# Patient Record
(demographics unavailable — no encounter records)

---

## 2024-10-30 NOTE — HISTORY OF PRESENT ILLNESS
[FreeTextEntry1] : 48 YO M seen TODAY 10/30/2024 as NPT for pain in the penis. He told me that for the past month, he has had a feeling of pressure in his testes. not related to any activity. It is mild and tolerable, but bothersome. Over the last week, it has also started in the penile shaft. This has never happened before and he has not been evaluated or treated for this yet. Patient is on no medication, NKMA, no known FH of prostate cancer. Patient is a  on [facility].  UA negative IPSS 0 HANNAH 0 JOHN 25

## 2024-10-30 NOTE — PHYSICAL EXAM
[Normal Appearance] : normal appearance [General Appearance - In No Acute Distress] : no acute distress [Edema] : no peripheral edema [] : no respiratory distress [Abdomen Soft] : soft [Abdomen Tenderness] : non-tender [Abdomen Mass (___ Cm)] : no abdominal mass palpated [Abdomen Hernia] : no hernia was discovered [Costovertebral Angle Tenderness] : no ~M costovertebral angle tenderness [Urethral Meatus] : meatus normal [Penis Abnormality] : normal circumcised penis [Urinary Bladder Findings] : the bladder was normal on palpation [Scrotum] : the scrotum was normal [Epididymis] : the epididymides were normal [Testes Tenderness] : no tenderness of the testes [Testes Mass (___cm)] : there were no testicular masses [Prostate Tenderness] : the prostate was not tender [No Prostate Nodules] : no prostate nodules [Prostate Size ___ (0-4)] : prostate size [unfilled] (scale: 0-4) [Normal Station and Gait] : the gait and station were normal for the patient's age [Skin Turgor] : supple [No Focal Deficits] : no focal deficits [Oriented To Time, Place, And Person] : oriented to person, place, and time [Affect] : the affect was normal [Mood] : the mood was normal [de-identified] : Both testes in good scrotal position with normal lie in the erect and supine position.

## 2024-10-30 NOTE — PHYSICAL EXAM
[Normal Appearance] : normal appearance [General Appearance - In No Acute Distress] : no acute distress [Edema] : no peripheral edema [] : no respiratory distress [Abdomen Soft] : soft [Abdomen Tenderness] : non-tender [Abdomen Mass (___ Cm)] : no abdominal mass palpated [Abdomen Hernia] : no hernia was discovered [Costovertebral Angle Tenderness] : no ~M costovertebral angle tenderness [Urethral Meatus] : meatus normal [Penis Abnormality] : normal circumcised penis [Urinary Bladder Findings] : the bladder was normal on palpation [Scrotum] : the scrotum was normal [Epididymis] : the epididymides were normal [Testes Tenderness] : no tenderness of the testes [Testes Mass (___cm)] : there were no testicular masses [Prostate Tenderness] : the prostate was not tender [No Prostate Nodules] : no prostate nodules [Prostate Size ___ (0-4)] : prostate size [unfilled] (scale: 0-4) [Normal Station and Gait] : the gait and station were normal for the patient's age [Skin Turgor] : supple [No Focal Deficits] : no focal deficits [Oriented To Time, Place, And Person] : oriented to person, place, and time [Affect] : the affect was normal [Mood] : the mood was normal [de-identified] : Both testes in good scrotal position with normal lie in the erect and supine position.

## 2024-10-30 NOTE — ASSESSMENT
[FreeTextEntry1] : Findings today including normal physical examination do not point to any etiology of the patient's symptoms.  No pain or discomfort was elicited during the examination that I could assess.  We discussed options including no intervention at this time and reassess in 1 month versus return prior to 1 month for a scrotal ultrasound.  We reviewed the indications risks alternatives and chances for success with this approach I recommended and he opted for the latter.  Plans made for him to return in 2 weeks at which time we will reassess and he will undergo scrotal ultrasound.  Until then I recommended NSAID therapy as needed. Claudine Silva MD

## 2024-10-30 NOTE — HISTORY OF PRESENT ILLNESS
[FreeTextEntry1] : 46 YO M seen TODAY 10/30/2024 as NPT for pain in the penis. He told me that for the past month, he has had a feeling of pressure in his testes. not related to any activity. It is mild and tolerable, but bothersome. Over the last week, it has also started in the penile shaft. This has never happened before and he has not been evaluated or treated for this yet. Patient is on no medication, NKMA, no known FH of prostate cancer. Patient is a  on [facility].  UA negative IPSS 0 HANNAH 0 JOHN 25

## 2024-11-14 NOTE — PHYSICAL EXAM
[General Appearance - In No Acute Distress] : no acute distress [Urethral Meatus] : meatus normal [Penis Abnormality] : normal circumcised penis [Scrotum] : the scrotum was normal [Epididymis] : the epididymides were normal [Testes Tenderness] : no tenderness of the testes [Testes Mass (___cm)] : there were no testicular masses

## 2024-11-14 NOTE — HISTORY OF PRESENT ILLNESS
[FreeTextEntry1] : 46 YO M seen 10/30/2024 as NPT for pain in the penis. He told me that for the past month, he has had a feeling of pressure in his testes. not related to any activity. It is mild and tolerable, but bothersome. Over the last week, it has also started in the penile shaft. This has never happened before and he has not been evaluated or treated for this yet. Patient is on no medication, NKMA, no known FH of prostate cancer. Patient is a  on [facility].  UA negative IPSS 0 HANNAH 0 JOHN 25 Findings today including normal physical examination do not point to any etiology of the patient's symptoms. No pain or discomfort was elicited during the examination that I could assess. We discussed options including no intervention at this time and reassess in 1 month versus return prior to 1 month for a scrotal ultrasound. We reviewed the indications risks alternatives and chances for success with this approach I recommended and he opted for the latter. Plans made for him to return in 2 weeks at which time we will reassess and he will undergo scrotal ultrasound. Until then I recommended NSAID therapy as needed.   Patient seen TODAY 11/14/2024 to reassess with scrotal US and exam. He told me that his pain subsided shortly after my last exam.  UA  negative SCROTAL US: RIGHT HEMISCROTUM: Testis: 18.4 cc 5.5 x 2.8 x 2.2 cm (L x W x H) RI 0.49 Echogenicity: Normal, microcalcifications visualized Masses: None Epididymis: Caput: 11.2 mm x 5.6 mm corpus: 4.1 mm Paratesticular findings There are no varicoceles present with veins measuring 2.1 mm in greatest diameter. LEFT HEMISCROTUM: Testis: 17.1 cc 4.7 x 3.1 x 2.2 cm (L x W x H) RI 0.57 Echogenicity: Normal, microcalcifications visualized Masses: None Epididymis: Caput: 9.3 mm x 7.7 mm, corpus: 2.5 mm Nonvascular simple cyst 5.0 x 5.0 mm Paratesticular findings: There are no/ varicoceles present with veins measuring mm in greatest diameter with retrograde flow confirmed by color doppler sonography. Impression: Sonographic appearance of the testes: homogeneous Hydrocele: none Spermatocele: none Varicocele: none Intratesticular calcifications: bilaterally Resistive index: elevated bilaterally Testicular masses: none Epididymis: normal

## 2024-11-14 NOTE — PHYSICAL EXAM
[General Appearance - In No Acute Distress] : no acute distress [Urethral Meatus] : meatus normal [Penis Abnormality] : normal circumcised penis [Scrotum] : the scrotum was normal [Testes Tenderness] : no tenderness of the testes [Epididymis] : the epididymides were normal [Testes Mass (___cm)] : there were no testicular masses

## 2024-11-14 NOTE — ASSESSMENT
[FreeTextEntry1] : Findings today are consistent with resolution of the patient's testicular and penile pain.  Physical examination and corroborating scrotal ultrasound confirm normal testes with bilateral testicular microcalcification, no testicular masses, no epididymal masses and a small left caput epididymal cyst.  No intervention is indicated at this time.  Follow-up in 6 months with repeat scrotal ultrasound. Claudine Silva MD

## 2025-05-16 NOTE — HISTORY OF PRESENT ILLNESS
[FreeTextEntry1] : 47 YO M seen 10/30/2024 as NPT for pain in the penis. He told me that for the past month, he has had a feeling of pressure in his testes. not related to any activity. It is mild and tolerable, but bothersome. Over the last week, it has also started in the penile shaft. This has never happened before and he has not been evaluated or treated for this yet. Patient is on no medication, NKMA, no known FH of prostate cancer. Patient is a  on [facility].  UA negative IPSS 0 HANNAH 0 JOHN 25 Findings today including normal physical examination do not point to any etiology of the patient's symptoms. No pain or discomfort was elicited during the examination that I could assess. We discussed options including no intervention at this time and reassess in 1 month versus return prior to 1 month for a scrotal ultrasound. We reviewed the indications risks alternatives and chances for success with this approach I recommended and he opted for the latter. Plans made for him to return in 2 weeks at which time we will reassess and he will undergo scrotal ultrasound. Until then I recommended NSAID therapy as needed.   Patient seen TODAY 11/14/2024 to reassess with scrotal US and exam. He told me that his pain subsided shortly after my last exam.  UA  negative SCROTAL US: RIGHT HEMISCROTUM: Testis: 18.4 cc 5.5 x 2.8 x 2.2 cm (L x W x H) RI 0.49 Echogenicity: Normal, microcalcifications visualized Masses: None Epididymis: Caput: 11.2 mm x 5.6 mm corpus: 4.1 mm Paratesticular findings There are no varicoceles present with veins measuring 2.1 mm in greatest diameter. LEFT HEMISCROTUM: Testis: 17.1 cc 4.7 x 3.1 x 2.2 cm (L x W x H) RI 0.57 Echogenicity: Normal, microcalcifications visualized Masses: None Epididymis: Caput: 9.3 mm x 7.7 mm, corpus: 2.5 mm Nonvascular simple cyst 5.0 x 5.0 mm Paratesticular findings: There are no/ varicoceles present with veins measuring mm in greatest diameter with retrograde flow confirmed by color doppler sonography. Impression: Sonographic appearance of the testes: homogeneous Hydrocele: none Spermatocele: none Varicocele: none Intratesticular calcifications: bilaterally Resistive index: elevated bilaterally Testicular masses: none Epididymis: normal Findings today are consistent with resolution of the patient's testicular and penile pain. Physical examination and corroborating scrotal ultrasound confirm normal testes with bilateral testicular microcalcification, no testicular masses, no epididymal masses and a small left caput epididymal cyst. No intervention is indicated at this time. Follow-up in 6 months with repeat scrotal ultrasound.   Patient seen TODAY 5/16/2025 to reassess.